# Patient Record
Sex: MALE | Race: WHITE | NOT HISPANIC OR LATINO | Employment: PART TIME | URBAN - METROPOLITAN AREA
[De-identification: names, ages, dates, MRNs, and addresses within clinical notes are randomized per-mention and may not be internally consistent; named-entity substitution may affect disease eponyms.]

---
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
## 2024-03-01 ENCOUNTER — APPOINTMENT (OUTPATIENT)
Dept: RADIOLOGY | Facility: HOSPITAL | Age: 19
End: 2024-03-01
Payer: COMMERCIAL

## 2024-03-01 ENCOUNTER — HOSPITAL ENCOUNTER (EMERGENCY)
Facility: HOSPITAL | Age: 19
Discharge: HOME | End: 2024-03-01
Attending: EMERGENCY MEDICINE
Payer: COMMERCIAL

## 2024-03-01 VITALS
WEIGHT: 170 LBS | BODY MASS INDEX: 27.32 KG/M2 | HEART RATE: 70 BPM | TEMPERATURE: 97.2 F | DIASTOLIC BLOOD PRESSURE: 74 MMHG | OXYGEN SATURATION: 98 % | SYSTOLIC BLOOD PRESSURE: 118 MMHG | RESPIRATION RATE: 14 BRPM | HEIGHT: 66 IN

## 2024-03-01 DIAGNOSIS — R07.9 CHEST PAIN, UNSPECIFIED TYPE: Primary | ICD-10-CM

## 2024-03-01 LAB
ALBUMIN SERPL BCP-MCNC: 4.6 G/DL (ref 3.4–5)
ALP SERPL-CCNC: 112 U/L (ref 33–120)
ALT SERPL W P-5'-P-CCNC: 11 U/L (ref 10–52)
ANION GAP SERPL CALC-SCNC: 14 MMOL/L (ref 10–20)
AST SERPL W P-5'-P-CCNC: 18 U/L (ref 9–39)
BASOPHILS # BLD AUTO: 0.03 X10*3/UL (ref 0–0.1)
BASOPHILS NFR BLD AUTO: 0.4 %
BILIRUB SERPL-MCNC: 1.6 MG/DL (ref 0–1.2)
BNP SERPL-MCNC: 10 PG/ML (ref 0–99)
BUN SERPL-MCNC: 14 MG/DL (ref 6–23)
CALCIUM SERPL-MCNC: 9.5 MG/DL (ref 8.6–10.3)
CARDIAC TROPONIN I PNL SERPL HS: 5 NG/L (ref 0–20)
CHLORIDE SERPL-SCNC: 105 MMOL/L (ref 98–107)
CO2 SERPL-SCNC: 23 MMOL/L (ref 21–32)
CREAT SERPL-MCNC: 1.09 MG/DL (ref 0.5–1.3)
EGFRCR SERPLBLD CKD-EPI 2021: >90 ML/MIN/1.73M*2
EOSINOPHIL # BLD AUTO: 0.09 X10*3/UL (ref 0–0.7)
EOSINOPHIL NFR BLD AUTO: 1.3 %
ERYTHROCYTE [DISTWIDTH] IN BLOOD BY AUTOMATED COUNT: 13.3 % (ref 11.5–14.5)
GLUCOSE SERPL-MCNC: 105 MG/DL (ref 74–99)
HCT VFR BLD AUTO: 45.5 % (ref 41–52)
HGB BLD-MCNC: 15.8 G/DL (ref 13.5–17.5)
IMM GRANULOCYTES # BLD AUTO: 0.01 X10*3/UL (ref 0–0.7)
IMM GRANULOCYTES NFR BLD AUTO: 0.1 % (ref 0–0.9)
LYMPHOCYTES # BLD AUTO: 2.45 X10*3/UL (ref 1.2–4.8)
LYMPHOCYTES NFR BLD AUTO: 34.8 %
MAGNESIUM SERPL-MCNC: 1.99 MG/DL (ref 1.6–2.4)
MCH RBC QN AUTO: 28 PG (ref 26–34)
MCHC RBC AUTO-ENTMCNC: 34.7 G/DL (ref 32–36)
MCV RBC AUTO: 81 FL (ref 80–100)
MONOCYTES # BLD AUTO: 0.61 X10*3/UL (ref 0.1–1)
MONOCYTES NFR BLD AUTO: 8.7 %
NEUTROPHILS # BLD AUTO: 3.85 X10*3/UL (ref 1.2–7.7)
NEUTROPHILS NFR BLD AUTO: 54.7 %
NRBC BLD-RTO: 0 /100 WBCS (ref 0–0)
PLATELET # BLD AUTO: 292 X10*3/UL (ref 150–450)
POTASSIUM SERPL-SCNC: 3.4 MMOL/L (ref 3.5–5.3)
PROT SERPL-MCNC: 7.4 G/DL (ref 6.4–8.2)
RBC # BLD AUTO: 5.65 X10*6/UL (ref 4.5–5.9)
SODIUM SERPL-SCNC: 139 MMOL/L (ref 136–145)
T4 FREE SERPL-MCNC: 1.04 NG/DL (ref 0.61–1.12)
TSH SERPL-ACNC: 4.75 MIU/L (ref 0.44–3.98)
WBC # BLD AUTO: 7 X10*3/UL (ref 4.4–11.3)

## 2024-03-01 PROCEDURE — 83735 ASSAY OF MAGNESIUM: CPT | Performed by: EMERGENCY MEDICINE

## 2024-03-01 PROCEDURE — 84484 ASSAY OF TROPONIN QUANT: CPT | Performed by: EMERGENCY MEDICINE

## 2024-03-01 PROCEDURE — 99284 EMERGENCY DEPT VISIT MOD MDM: CPT | Mod: 25

## 2024-03-01 PROCEDURE — 84439 ASSAY OF FREE THYROXINE: CPT | Performed by: EMERGENCY MEDICINE

## 2024-03-01 PROCEDURE — 83880 ASSAY OF NATRIURETIC PEPTIDE: CPT | Performed by: EMERGENCY MEDICINE

## 2024-03-01 PROCEDURE — 80053 COMPREHEN METABOLIC PANEL: CPT | Performed by: EMERGENCY MEDICINE

## 2024-03-01 PROCEDURE — 36415 COLL VENOUS BLD VENIPUNCTURE: CPT | Performed by: EMERGENCY MEDICINE

## 2024-03-01 PROCEDURE — 84443 ASSAY THYROID STIM HORMONE: CPT | Performed by: EMERGENCY MEDICINE

## 2024-03-01 PROCEDURE — 71046 X-RAY EXAM CHEST 2 VIEWS: CPT | Performed by: STUDENT IN AN ORGANIZED HEALTH CARE EDUCATION/TRAINING PROGRAM

## 2024-03-01 PROCEDURE — 71046 X-RAY EXAM CHEST 2 VIEWS: CPT

## 2024-03-01 PROCEDURE — 85025 COMPLETE CBC W/AUTO DIFF WBC: CPT | Performed by: EMERGENCY MEDICINE

## 2024-03-01 PROCEDURE — 99283 EMERGENCY DEPT VISIT LOW MDM: CPT | Performed by: EMERGENCY MEDICINE

## 2024-03-01 ASSESSMENT — PAIN DESCRIPTION - ORIENTATION: ORIENTATION: LEFT;UPPER

## 2024-03-01 ASSESSMENT — COLUMBIA-SUICIDE SEVERITY RATING SCALE - C-SSRS
2. HAVE YOU ACTUALLY HAD ANY THOUGHTS OF KILLING YOURSELF?: NO
1. IN THE PAST MONTH, HAVE YOU WISHED YOU WERE DEAD OR WISHED YOU COULD GO TO SLEEP AND NOT WAKE UP?: NO
6. HAVE YOU EVER DONE ANYTHING, STARTED TO DO ANYTHING, OR PREPARED TO DO ANYTHING TO END YOUR LIFE?: NO

## 2024-03-01 ASSESSMENT — PAIN SCALES - GENERAL
PAINLEVEL_OUTOF10: 4
PAINLEVEL_OUTOF10: 0 - NO PAIN

## 2024-03-01 ASSESSMENT — PAIN - FUNCTIONAL ASSESSMENT: PAIN_FUNCTIONAL_ASSESSMENT: 0-10

## 2024-03-01 ASSESSMENT — PAIN DESCRIPTION - LOCATION: LOCATION: CHEST

## 2024-03-01 ASSESSMENT — PAIN DESCRIPTION - PAIN TYPE: TYPE: ACUTE PAIN

## 2024-03-01 NOTE — ED TRIAGE NOTES
Pt here with c/o left upper cp intermittently. No sob,  n/v. Slight dizziness. Orthos neg per ems. 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

## 2024-03-01 NOTE — ED PROVIDER NOTES
Sally Cunningham is a 19 y.o. patient presenting to the ED for chest pain.  The patient has a history of WPW status post ablation greater than 1 year ago.  He has had intermittent episodes of exertional chest pain since then.  He has been following with his cardiologist.  He has not had any syncope.  He denies any lightheadedness or shortness of breath tonight.  He states the pain is left-sided, squeezing and intermittent intensity.  It is similar to prior episodes that he has had.  He states he has had previous normal workups when this has happened before.  No recent fever or feeling ill.    Additional History Obtained from: None  Limitations to History: None  ------------------------------------------------------------------------------------------------------------------------------------------  Physical Exam:  Appearance: Alert, cooperative, not distressed.  Skin: Warm, dry, appropriate color for ethnicity.  Eyes: Cornea clear. No scleral icterus or injection.   ENT: Mucous membranes moist.  Pulmonary: No accessory muscle use or stridor. Clear lung sounds bilaterally without rhonchi or wheezing.   Cardiac: Heart sounds regular soft dyastolic murmur.  B/L radial pulses full and symmetric.   Abdomen: Soft, not tender.  No rebound or guarding.   Musculoskeletal: No gross deformities.   Neurological: Face symmetrical. Voice clear. Appropriately conversant.   Psychiatric: Appropriate mood and affect.    Medical Decision Making:  Chronic Medical Conditions Significantly Affecting Care:  has a past medical history of Bicuspid aortic valve and Sinusitis.  WPW status post ablation.    Social Determinants of Health Significantly Affecting Care: None identified    Differential Diagnosis Considered but not limited to: ACS, pericarditis, myocarditis, musculoskeletal pain, acid reflux.  The patient does not have any clinical signs to suggest volume overload.      External Records Reviewed:   I reviewed recent and relevant  outside records including:     Independent Interpretation of Studies: The following studies were ordered as part of the emergency department work up and independently interpreted by me. See ED Course for details.    ED Course as of 03/01/24 0707   Fri Mar 01, 2024   0122 EKG performed at 120 and interpreted by me shows sinus rhythm at a rate of 76.  Intervals are normal.  The axis is normal.  There are no ST elevations or depressions.  No T wave changes.  No STEMI. [SP]   0255 CBC is within normal limits.  Troponin is normal.  BNP and magnesium are normal.  CMP is with borderline low potassium at 3.4 and otherwise normal.  TSH is mildly elevated at 4.7. [SP]   0307 Free T4 is normal. [SP]      ED Course User Index  [SP] Karen Bhatt DO         Diagnoses as of 03/01/24 0707   Chest pain, unspecified type         Escalation of Care:  HEART score: 1  At this time I do believe the patient to be stable for outpatient management.  Test results were discussed with the patient.  Follow-up and return precautions were also given.  Patient is encouraged to return to the emergency department should they develop any new or worsening symptoms.         Karen Bhatt DO  03/01/24 0711